# Patient Record
Sex: MALE | Race: WHITE | Employment: UNEMPLOYED | URBAN - METROPOLITAN AREA
[De-identification: names, ages, dates, MRNs, and addresses within clinical notes are randomized per-mention and may not be internally consistent; named-entity substitution may affect disease eponyms.]

---

## 2017-09-22 ENCOUNTER — OFFICE VISIT (OUTPATIENT)
Dept: ORTHOPEDICS | Facility: CLINIC | Age: 27
End: 2017-09-22

## 2017-09-22 VITALS — HEIGHT: 73 IN | WEIGHT: 275.5 LBS | BODY MASS INDEX: 36.51 KG/M2

## 2017-09-22 DIAGNOSIS — C49.9 SARCOMA OF SOFT TISSUE (H): Primary | ICD-10-CM

## 2017-09-22 RX ORDER — DULOXETIN HYDROCHLORIDE 30 MG/1
CAPSULE, DELAYED RELEASE ORAL
COMMUNITY
Start: 2017-07-28

## 2017-09-22 RX ORDER — ALPRAZOLAM 1 MG
1 TABLET ORAL
COMMUNITY

## 2017-09-22 RX ORDER — ALBUTEROL SULFATE 90 UG/1
AEROSOL, METERED RESPIRATORY (INHALATION)
COMMUNITY
Start: 2016-12-05

## 2017-09-22 RX ORDER — CLONAZEPAM 1 MG/1
TABLET ORAL
COMMUNITY
Start: 2017-07-11

## 2017-09-22 RX ORDER — MORPHINE SULFATE 15 MG/1
15 TABLET, FILM COATED, EXTENDED RELEASE ORAL
COMMUNITY
Start: 2017-07-21

## 2017-09-22 RX ORDER — DEXAMETHASONE SODIUM PHOSPHATE 4 MG/ML
INJECTION, SOLUTION INTRA-ARTICULAR; INTRALESIONAL; INTRAMUSCULAR; INTRAVENOUS; SOFT TISSUE
Qty: 30 ML | Refills: 3 | Status: SHIPPED | OUTPATIENT
Start: 2017-09-22

## 2017-09-22 RX ORDER — BACLOFEN 20 MG/1
TABLET ORAL
COMMUNITY
Start: 2014-03-07

## 2017-09-22 RX ORDER — SULINDAC 200 MG/1
200 TABLET ORAL
COMMUNITY
Start: 2017-07-05

## 2017-09-22 RX ORDER — TETRAHYDROZOLINE HCL 0.05 %
1-2 DROPS OPHTHALMIC (EYE)
COMMUNITY

## 2017-09-22 RX ORDER — HYDROCODONE BITARTRATE AND ACETAMINOPHEN 5; 325 MG/1; MG/1
TABLET ORAL
COMMUNITY
Start: 2017-07-21

## 2017-09-22 ASSESSMENT — ENCOUNTER SYMPTOMS
POLYPHAGIA: 0
INCREASED ENERGY: 0
JOINT SWELLING: 0
CHILLS: 0
STIFFNESS: 1
ARTHRALGIAS: 0
MYALGIAS: 1
BACK PAIN: 1
DECREASED APPETITE: 1
INSOMNIA: 1
FEVER: 0
DEPRESSION: 0
MUSCLE WEAKNESS: 0
NERVOUS/ANXIOUS: 1
WEIGHT LOSS: 1
NECK PAIN: 1
FATIGUE: 1
HALLUCINATIONS: 0
MUSCLE CRAMPS: 1
POLYDIPSIA: 0
WEIGHT GAIN: 0
PANIC: 0
DECREASED CONCENTRATION: 0
ALTERED TEMPERATURE REGULATION: 0
NIGHT SWEATS: 0

## 2017-09-22 NOTE — LETTER
9/22/2017       RE: Eduardo Shah  [address] [address] [facility] [address]     Dear Colleague,    Thank you for referring your patient, Eduardo Shah, to the [facility]TH[facility] at [facility]. Please see a copy of my visit note below.    I was present with the PA during the history and exam.  I discussed the case with the PA and agree with the findings as documented in the assessment and plan.     Dictation on: 09/22/2017  2:33 PM by: DIONICIO LOU [[email or web address]]         OUTPATIENT PROGRESS NOTE      CHIEF COMPLAINT:  Right thigh pain.      HISTORY OF PRESENT ILLNESS:  Eduardo is a 27-year-old male with a history of a right thigh soft tissue sarcoma.  He had neoadjuvant chemotherapy and wide excision on 05/08/2014 by Dr. García.  He also had postoperative radiation treatment up near [place].  We have not seen him back for 2 years.  Eduardo reports that for the last couple of years, he has had worsening right side pain.  He states that area that was radiated is very hard and painful.  It is not flexible.  He has pain with prolonged sitting and prolonged standing.  He has pain at night, and is only sleeping a couple of hours at a time.  He has been dealing with a pain specialist locally.  He is taking MS Contin 15 mg every 12 hours, Vicodin 5/325 throughout the day, Cymbalta 30 mg daily, and Zanaflex 4 mg twice daily.  He reports little relief from his pain.  He is wondering about physical therapy.  He is not using anything for gait assistance.  He works as a , and used to work up to 50 hours a week.  Now, he is only working 20 hours a week due to his pain.  He is  with a young son, and has difficulty interacting with his family due to his pain and limitations.  He is frustrated, and wondering if there is any other significant treatment that would be helpful for him.  He is wondering if he will always be disabled.  He recently had an MRI and chest x-ray  locally done.  We are trying to obtain those images.  Eduardo got a report from his doctor that everything was clear.  No other concerns.  The patient is receiving counseling services twice a month.  He is currently smoking, but has cut way back.  I have reviewed his medical intake in the electronic medical record.      PHYSICAL EXAMINATION:  Eduardo is a 27-year-old male who is overweight, but has lost up to 80 pounds since his previous visit.  He appears to despondent at times during the exam, but is cooperative.  Upon ambulation, he has minimal sign of limp or antalgic gait today.  The right lateral thigh has a well-healed scar, but there is a zone around it from the radiation that has significant fibrosis and hardening of the skin and soft tissues.  He has no obvious knee effusion.  No knee or hip pain.  He is tender just in the area around the radiation field.  No obvious masses.  No skin wounds at this time.  He is neurovascularly intact distally.  He does have decreased knee flexion; his left knee and flexes to approximately 130 degrees, the right knee and flexes to only about 110 degrees with significant pain.  He does have full extension.  He has pain with resisted straight leg raise, but is able to perform that 4+/5 strength.  He has 4+/5 resisted knee extension and knee flexion on the right compared to 5/5 on the left.  He has fairly normal hip rotation, again just limited at the end range due to tightness.      IMAGING:  We are waiting for outside MRI and chest x-ray to be imported into our system, and Dr. García will review that and get back to the patient.      IMPRESSION:     1.  A 27-year-old male just over 3 years status post wide excision of right thigh sarcoma with neoadjuvant chemotherapy and postop radiation.   2.  Persistent right thigh pain and fibrosis with decreased range of motion.      PLAN:  Eduardo really has a difficult problem.  Postop radiation changes can cause significant destruction to  tissues.  The first option we would recommend would be aggressive physical therapy to work on scar mobilization, range of motion and quadriceps strengthening.  They can use modalities such as ultrasound or iontophoresis to help with that.  He can do biking or swimming as tolerated.  They will also work on core strengthening.  Hopefully, this will improve some of his pain.  Our goal is not to take away all his pain, as that is probably not reasonable, but we would like to get it manageable, so that he can participate in a full workday and participate in activities with his family.  Another option is to surgically excise and free up some of the scar tissue and damaged muscle.  There is a great risk of wound healing problems due to the radiated area.  Some patients have had some increased range of motion with this operation.  We are not proposing that yet, but would like to see how physical therapy goes.  Eduardo is very hesitant about further surgery.  He agrees with plan for physical therapy, and will do that at home.  We did also encourage him to work on quitting smoking, especially down the road if he does need surgery, as this does complicate wound healing as well.  We would like to see him back in 2 months for a check of his progress and then proceed from there.  He agrees, and all questions were answered.  Dr. García examined the patient as well, and agrees with the plan of care.      Again, thank you for allowing me to participate in the care of your patient.      Sincerely,    Danny García MD

## 2017-09-22 NOTE — MR AVS SNAPSHOT
After Visit Summary   9/22/2017    Eduardo Shah    MRN: [id number]           Patient Information     Date Of Birth          1990        Visit Information        Provider Department      9/22/2017 10:30 AM Danny García MD [facility]        Today's Diagnoses     Sarcoma of soft tissue (H)    -  1       Follow-ups after your visit        Additional Services     PHYSICAL THERAPY REFERRAL (External-Prints)       Physical Therapy Referral                  Follow-up notes from your care team     Return in about 3 months (around 12/22/2017).      Your next 10 appointments already scheduled     Dec 22, 2017 11:00 AM CST   (Arrive by 10:45 AM)   Return Visit with Danny García MD   [facility] ([facility])    [address] [place] [id number] [phone]              Who to contact     Please call your clinic at [phone] to:    Ask questions about your health    Make or cancel appointments    Discuss your medicines    Learn about your test results    Speak to your doctor   If you have compliments or concerns about an experience at your clinic, or if you wish to file a complaint, please contact [facility] Patient Relations at [phone] or email us at [email or web address]         Additional Information About Your Visit        MyChart Information     GIGASt is an electronic gateway that provides easy, online access to your medical records. With AlephD, you can request a clinic appointment, read your test results, renew a prescription or communicate with your care team.     To sign up for GIGASt visit the website at [email or web address]   You will be asked to enter the access code listed below, as well as some personal information. Please follow the directions to create your username and password.     Your access code is: [email or web address]  Expires:  "2017  6:30 AM     Your access code will  in 90 days. If you need help or a new code, please contact your [facility] or call [phone] for assistance.        Care EveryWhere ID     This is your Care EveryWhere ID. This could be used by other organizations to access your [facility] medical records  [id number]        Your Vitals Were     Height BMI (Body Mass Index)                1.86 m (6' 1.23\") 36.12 kg/m2           Blood Pressure from Last 3 Encounters:   No data found for BP    Weight from Last 3 Encounters:   No data found for Wt              Today, you had the following     No orders found for display         Today's Medication Changes          These changes are accurate as of: 17 11:59 PM.  If you have any questions, ask your nurse or doctor.               Start taking these medicines.        Dose/Directions    dexamethasone 4 MG/ML injection   Commonly known as:  DECADRON   Used for:  Sarcoma of soft tissue (H)   Started by:  Danny García MD        Use 4 mg or dose determined by provider for iontophoresis.   Quantity:  30 mL   Refills:  3            Where to get your medicines      Some of these will need a paper prescription and others can be bought over the counter.  Ask your nurse if you have questions.     Bring a paper prescription for each of these medications     dexamethasone 4 MG/ML injection                Primary Care Provider Office Phone # Fax #    Mau Ruiz [phone]       [facility] [address] [facility] [address]        Equal Access to Services     INGA RICHTER AH: Hadii rod denny Soflor, waaxda luqadaha, qaybta kaalmada adeegyada, waxtrent paul frye adedano peterson. So [facility] [phone].    ATENCIÓN: Si habla español, tiene a chua disposición servicios gratuitos de asistencia lingüística. Llame al [phone].    We comply with applicable federal civil rights laws and [place] " laws. We do not discriminate on the basis of race, color, national origin, age, disability, sex, sexual orientation, or gender identity.            Thank you!     Thank you for choosing [facility]  for your care. Our goal is always to provide you with excellent care. Hearing back from our patients is one way we can continue to improve our services. Please take a few minutes to complete the written survey that you may receive in the mail after your visit with us. Thank you!             Your Updated Medication List - Protect others around you: Learn how to safely use, store and throw away your medicines at www.disposemymeds.org.          This list is accurate as of: 9/22/17 11:59 PM.  Always use your most recent med list.                   Brand Name Dispense Instructions for use Diagnosis    * ALPRAZolam 1 MG tablet    XANAX     Take 1 mg by mouth 3 times daily as needed        * ALPRAZolam 1 MG tablet    XANAX     Take 1 mg by mouth        * baclofen 20 MG tablet    LIORESAL     Take 20 mg by mouth 3 times daily        * baclofen 20 MG tablet    LIORESAL          carisoprodol 350 MG tablet    SOMA     Take by mouth 4 times daily as needed for muscle spasms        clonazePAM 1 MG tablet    klonoPIN     TAKE 1 OR 2 TABLETS DAILY AND TAKE ONE TO TWO TABLETS BY MOUTH AT BEDTIME AS NEEDED -MAX 3 TABLETS ONCE DAILY        dexamethasone 4 MG/ML injection    DECADRON    30 mL    Use 4 mg or dose determined by provider for iontophoresis.    Sarcoma of soft tissue (H)       DULoxetine 30 MG EC capsule    CYMBALTA     TAKE 1 CAPSULE BY MOUTH ONCE DAILY FOR 7 DAYS THEN TAKE 1 TWICE DAILY THEREAFTER        gabapentin 300 MG capsule    NEURONTIN    90 capsule    Take 1 capsule (300 mg) by mouth 3 times daily    Sarcoma (H)       HYDROcodone-acetaminophen 5-325 MG per tablet    NORCO          hydrOXYzine 50 MG tablet    ATARAX    120 tablet    Take 1 tablet (50 mg) by mouth 3 times daily as needed    Sarcoma (H)        morphine 15 MG 12 hr tablet    MS CONTIN     Take 15 mg by mouth        sulindac 200 MG tablet    CLINORIL     Take 200 mg by mouth        tetrahydrozoline 0.05 % ophthalmic solution      Apply 1-2 drops to eye        tiZANidine 4 MG tablet    ZANAFLEX     TAKE 1-2 TABLETS BY MOUTH TWICE DAILY AS NEEDED        VENTOLIN  (90 BASE) MCG/ACT Inhaler   Generic drug:  albuterol           * Notice:  This list has 4 medication(s) that are the same as other medications prescribed for you. Read the directions carefully, and ask your doctor or other care provider to review them with you.

## 2017-09-22 NOTE — NURSING NOTE
"Reason For Visit:   Chief Complaint   Patient presents with     RECHECK     Pt. states that he is here today for follow up, Right Thigh Tumor. HX: Wide Resection Right Thigh Tumor. DOS: 05/08/2014.        Pain Assessment  Patient Currently in Pain: Yes  0-10 Pain Scale: 5  Primary Pain Location: Hip  Pain Orientation: Right  Pain Descriptors: Discomfort, Numbness  Alleviating Factors: Pain medication, NSAIDS, Rest  Aggravating Factors: Movement, Lying, Sitting, Walking, Standing               HEIGHT: 6' 1.228\", WEIGHT: 275 lbs 8 oz, BMI: Body mass index is 36.12 kg/(m^2).      Current Outpatient Prescriptions   Medication Sig Dispense Refill     DULoxetine (CYMBALTA) 30 MG EC capsule TAKE 1 CAPSULE BY MOUTH ONCE DAILY FOR 7 DAYS THEN TAKE 1 TWICE DAILY THEREAFTER       HYDROcodone-acetaminophen (NORCO) 5-325 MG per tablet        morphine (MS CONTIN) 15 MG 12 hr tablet Take 15 mg by mouth       sulindac (CLINORIL) 200 MG tablet Take 200 mg by mouth       tetrahydrozoline 0.05 % ophthalmic solution Apply 1-2 drops to eye       tiZANidine (ZANAFLEX) 4 MG tablet TAKE 1-2 TABLETS BY MOUTH TWICE DAILY AS NEEDED       albuterol (VENTOLIN HFA) 108 (90 BASE) MCG/ACT Inhaler        ALPRAZolam (XANAX) 1 MG tablet Take 1 mg by mouth       baclofen (LIORESAL) 20 MG tablet        clonazePAM (KLONOPIN) 1 MG tablet TAKE 1 OR 2 TABLETS DAILY AND TAKE ONE TO TWO TABLETS BY MOUTH AT BEDTIME AS NEEDED -MAX 3 TABLETS ONCE DAILY       carisoprodol (SOMA) 350 MG tablet Take by mouth 4 times daily as needed for muscle spasms       baclofen (LIORESAL) 20 MG tablet Take 20 mg by mouth 3 times daily       hydrOXYzine (ATARAX) 50 MG tablet Take 1 tablet (50 mg) by mouth 3 times daily as needed 120 tablet 0     gabapentin (NEURONTIN) 300 MG capsule Take 1 capsule (300 mg) by mouth 3 times daily 90 capsule 0     ALPRAZolam (XANAX) 1 MG tablet Take 1 mg by mouth 3 times daily as needed          No Known Allergies            "

## 2017-09-22 NOTE — PROGRESS NOTES
Answers for HPI/ROS submitted by the patient on 9/22/2017   General Symptoms: Yes  Skin Symptoms: No  HENT Symptoms: No  EYE SYMPTOMS: No  HEART SYMPTOMS: No  LUNG SYMPTOMS: No  INTESTINAL SYMPTOMS: No  URINARY SYMPTOMS: No  REPRODUCTIVE SYMPTOMS: No  SKELETAL SYMPTOMS: Yes  BLOOD SYMPTOMS: No  NERVOUS SYSTEM SYMPTOMS: No  MENTAL HEALTH SYMPTOMS: Yes  Fever: No  Loss of appetite: Yes  Weight loss: Yes  Weight gain: No  Fatigue: Yes  Night sweats: No  Chills: No  Increased stress: No  Excessive hunger: No  Excessive thirst: No  Feeling hot or cold when others believe the temperature is normal: No  Loss of height: No  Post-operative complications: No  Surgical site pain: Yes  Hallucinations: No  Change in or Loss of Energy: No  Hyperactivity: No  Confusion: No  Back pain: Yes  Muscle aches: Yes  Neck pain: Yes  Swollen joints: No  Joint pain: No  Bone pain: Yes  Muscle cramps: Yes  Muscle weakness: No  Joint stiffness: Yes  Bone fracture: No  Nervous or Anxious: Yes  Depression: No  Trouble sleeping: Yes  Trouble thinking or concentrating: No  Mood changes: No  Panic attacks: No  OUTPATIENT PROGRESS NOTE      CHIEF COMPLAINT:  Right thigh pain.      HISTORY OF PRESENT ILLNESS:  Eduardo is a 27-year-old male with a history of a right thigh soft tissue sarcoma.  He had neoadjuvant chemotherapy and wide excision on 05/08/2014 by Dr. García.  He also had postoperative radiation treatment up near [place].  We have not seen him back for 2 years.  Eduardo reports that for the last couple of years, he has had worsening right thigh pain.  He states that area that was radiated is very hard and painful.  It is not flexible.  He has pain with prolonged sitting and prolonged standing.  He has pain at night, and is only sleeping a couple of hours at a time.  He has been dealing with a pain specialist locally.  He is taking MS Contin 15 mg every 12 hours, Vicodin 5/325 throughout the day, Cymbalta 30 mg daily, and Zanaflex 4 mg twice  daily.  He reports little relief from his pain.  He is wondering about physical therapy.  He is not using anything for gait assistance.  He works as a , and used to work up to 50 hours a week.  Now, he is only working 20 hours a week due to his pain.  He is  with a young son, and has difficulty interacting with his family due to his pain and limitations.  He is frustrated, and wondering if there is any other significant treatment that would be helpful for him.  He is wondering if he will always be disabled.  He recently had an MRI and chest x-ray locally done.  We are trying to obtain those images.  Eduardo got a report from his doctor that everything was clear.  No other concerns.  The patient is receiving counseling services twice a month.  He is currently smoking, but has cut way back.  I have reviewed his medical intake in the electronic medical record.      PHYSICAL EXAMINATION:  Eduardo is a 27-year-old male who is overweight, but has lost up to 80 pounds since his previous visit.  He appears despondent at times during the exam, but is cooperative.  Upon ambulation, he has minimal sign of limp or antalgic gait today.  The right lateral thigh has a well-healed scar, but there is a zone around it from the radiation that has significant fibrosis and hardening of the skin and soft tissues.  He has no obvious knee effusion.  No knee or hip pain.  He is tender just in the area around the radiation field.  No obvious masses.  No skin wounds at this time.  He is neurovascularly intact distally.  He does have decreased knee flexion; his left knee and flexes to approximately 130 degrees, the right knee and flexes to only about 110 degrees with significant pain.  He does have full extension.  He has pain with resisted straight leg raise, but is able to perform that 4+/5 strength.  He has 4+/5 resisted knee extension and knee flexion on the right compared to 5/5 on the left.  He has fairly normal hip rotation,  again just limited at the end range due to tightness.      IMAGING:  We are waiting for outside MRI and chest x-ray to be imported into our system, and Dr. García will review that and get back to the patient.      IMPRESSION:     1.  A 27-year-old male just over 3 years status post wide excision of right thigh sarcoma with neoadjuvant chemotherapy and postop radiation.   2.  Persistent right thigh pain and fibrosis with decreased range of motion.      PLAN:  Eduardo really has a difficult problem.  Postop radiation changes can cause significant destruction to tissues.  The first option we would recommend would be aggressive physical therapy to work on scar mobilization, range of motion and quadriceps strengthening.  They can use modalities such as ultrasound or iontophoresis to help with that.  He can do biking or swimming as tolerated.  They will also work on core strengthening.  Hopefully, this will improve some of his pain.  Our goal is not to take away all his pain, as that is probably not reasonable, but we would like to get it manageable, so that he can participate in a full workday and participate in activities with his family.  Another option is to surgically excise and free up some of the scar tissue and damaged muscle.  There is a great risk of wound healing problems due to the radiated area.  Some patients have had some increased range of motion with this operation.  We are not proposing that yet, but would like to see how physical therapy goes.  Eduardo is very hesitant about further surgery.  He agrees with plan for physical therapy, and will do that at home.  We did also encourage him to work on quitting smoking, especially down the road if he does need surgery, as this does complicate wound healing as well.  We would like to see him back in 2 months for a check of his progress and then proceed from there.  He agrees, and all questions were answered.  Dr. García examined the patient as well, and agrees  with the plan of care.

## 2017-10-23 DIAGNOSIS — C49.9 SARCOMA (H): Primary | ICD-10-CM

## 2017-11-07 ENCOUNTER — TRANSFERRED RECORDS (OUTPATIENT)
Dept: HEALTH INFORMATION MANAGEMENT | Facility: CLINIC | Age: 27
End: 2017-11-07

## 2017-12-22 ENCOUNTER — OFFICE VISIT (OUTPATIENT)
Dept: ORTHOPEDICS | Facility: CLINIC | Age: 27
End: 2017-12-22
Payer: COMMERCIAL

## 2017-12-22 VITALS — WEIGHT: 294 LBS | HEIGHT: 73 IN | BODY MASS INDEX: 38.97 KG/M2

## 2017-12-22 DIAGNOSIS — C49.9 SARCOMA OF SOFT TISSUE (H): Primary | ICD-10-CM

## 2017-12-22 NOTE — LETTER
12/22/2017       RE: Eduardo Shah  [address] [address] [facility] [address]     Dear Colleague,    Thank you for referring your patient, Eduardo Shah, to the [facility] at [facility]. Please see a copy of my visit note below.    I was present with the PA during the history and exam.  I discussed the case with the PA and agree with the findings as documented in the assessment and plan.    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  Last time we saw Gee we did put him on work restrictions, which he states his employer did not follow.  He is working 30 hours a week.  He is attending physical therapy 2-3 days a week.  He reports some improvement in his motion and the feel of the scar tissue, but he continues to have persistent pain that has not changed.  His goals are to decrease pain and to increase the ability to be active with his wife and young son.  He reports he has had a few episodes of his leg giving way.  He has noticed some weakness in the leg.  He denies any numbness or tingling.  No other concerns.  He is not using anything for gait assistance.    Physical exam: Gee is a 27-year-old male with a very flat affect.  He is pleasant and cooperative with the exam.  He has a mild limp and otherwise nonantalgic gait.  His right thigh has radiation changes but does feel more supple compared to the previous exam.  He does have tenderness to palpation of the area within the radiation field.  He has tightness with range of motion.  He has 0  of knee extension and 110  of knee flexion.  She is neurovascularly intact distally.  There is no knee effusion or erythema.    Impression: Persistent pain of the right thigh  status post wide excision of soft tissue sarcoma with postop radiation    Plan: I think Gee would continue to benefit from restrictions at work with less standing and less squatting activities.  He may want to consider a medical leave of absence to help continue to progress his strength and get his pain under control.  We would support this.  We are happy to fill out paperwork regarding this.  She should continue with physical therapy and home exercises.  He needs to work on quitting smoking as soon as possible.  I would like him to try a neoprene hinged knee brace for the right knee to help with stability with activity.  We will have him follow-up in 3 months for another check of his progress.  He will continue to get his pain medicines from his primary care physician, as he has been doing so all along.  All questions answered.  Dr. García examined the patient as well and agrees with the plan of care.    Again, thank you for allowing me to participate in the care of your patient.      Sincerely,    Danny García MD

## 2017-12-22 NOTE — NURSING NOTE
"Reason For Visit:   Chief Complaint   Patient presents with     RECHECK     Pt. states that he is here today for follow up, Right Thigh Tumor. HX: Wide Resection Right Thigh Tumor. DOS: 05/08/2014.        Pain Assessment  Patient Currently in Pain: Yes  0-10 Pain Scale: 7  Primary Pain Location: Leg  Pain Orientation: Right  Pain Descriptors: Discomfort  Alleviating Factors: Rest, Pain medication  Aggravating Factors: Movement, Sitting, Walking, Standing               HEIGHT: 6' 1.23\", WEIGHT: 294 lbs 0 oz, BMI: Body mass index is 38.55 kg/(m^2).      Current Outpatient Prescriptions   Medication Sig Dispense Refill     DULoxetine (CYMBALTA) 30 MG EC capsule TAKE 1 CAPSULE BY MOUTH ONCE DAILY FOR 7 DAYS THEN TAKE 1 TWICE DAILY THEREAFTER       HYDROcodone-acetaminophen (NORCO) 5-325 MG per tablet        morphine (MS CONTIN) 15 MG 12 hr tablet Take 15 mg by mouth       sulindac (CLINORIL) 200 MG tablet Take 200 mg by mouth       tetrahydrozoline 0.05 % ophthalmic solution Apply 1-2 drops to eye       tiZANidine (ZANAFLEX) 4 MG tablet TAKE 1-2 TABLETS BY MOUTH TWICE DAILY AS NEEDED       albuterol (VENTOLIN HFA) 108 (90 BASE) MCG/ACT Inhaler        ALPRAZolam (XANAX) 1 MG tablet Take 1 mg by mouth       baclofen (LIORESAL) 20 MG tablet        clonazePAM (KLONOPIN) 1 MG tablet TAKE 1 OR 2 TABLETS DAILY AND TAKE ONE TO TWO TABLETS BY MOUTH AT BEDTIME AS NEEDED -MAX 3 TABLETS ONCE DAILY       dexamethasone (DECADRON) 4 MG/ML injection Use 4 mg or dose determined by provider for iontophoresis. 30 mL 3     carisoprodol (SOMA) 350 MG tablet Take by mouth 4 times daily as needed for muscle spasms       baclofen (LIORESAL) 20 MG tablet Take 20 mg by mouth 3 times daily       hydrOXYzine (ATARAX) 50 MG tablet Take 1 tablet (50 mg) by mouth 3 times daily as needed 120 tablet 0     gabapentin (NEURONTIN) 300 MG capsule Take 1 capsule (300 mg) by mouth 3 times daily 90 capsule 0     ALPRAZolam (XANAX) 1 MG tablet Take 1 mg by " mouth 3 times daily as needed          No Known Allergies

## 2017-12-22 NOTE — MR AVS SNAPSHOT
"              After Visit Summary   2017    Eduardo Shah    MRN: [id number]           Patient Information     Date Of Birth          1990        Visit Information        Provider Department      2017 11:00 AM Danny García MD [facility]        Today's Diagnoses     Sarcoma of soft tissue (H)    -  1       Follow-ups after your visit        Who to contact     Please call your clinic at [phone] to:    Ask questions about your health    Make or cancel appointments    Discuss your medicines    Learn about your test results    Speak to your doctor   If you have compliments or concerns about an experience at your clinic, or if you wish to file a complaint, please contact [facility] Patient Relations at [phone] or email us at [email or web address]         Additional Information About Your Visit        MyChart Information     [email or web address] is an electronic gateway that provides easy, online access to your medical records. With [employer], you can request a clinic appointment, read your test results, renew a prescription or communicate with your care team.     To sign up for [email or web address] visit the website at [email or web address]   You will be asked to enter the access code listed below, as well as some personal information. Please follow the directions to create your username and password.     Your access code is: [email or web address]  Expires: 3/8/2018  6:30 AM     Your access code will  in 90 days. If you need help or a new code, please contact your [facility] or call [phone] for assistance.        Care EveryWhere ID     This is your Care EveryWhere ID. This could be used by other organizations to access your [facility] medical records  [id number]        Your Vitals Were     Height BMI (Body Mass Index)                1.86 m (6' 1.23\") 38.55 kg/m2           Blood Pressure from Last 3 Encounters: "   05/11/14 141/65   05/07/14 144/87   02/11/14 (!) 158/93    Weight from Last 3 Encounters:   12/22/17 133.4 kg (294 lb)   09/22/17 125 kg (275 lb 8 oz)   02/27/15 (!) 158.8 kg (350 lb)              Today, you had the following     No orders found for display       Primary Care Provider Office Phone # Fax #    Mau Ruiz [phone]       [facility] [address] [address]        Equal Access to Services     [facility]: Hadii rod denny Soflor, waaxda luqadaha, qaybta kaalmada migel, waylon chow . So [facility] [phone].    ATENCIÓN: Si habla español, tiene a chua disposición servicios gratuitos de asistencia lingüística. [facility] [phone].    We comply with applicable federal civil rights laws and [place] laws. We do not discriminate on the basis of race, color, national origin, age, disability, sex, sexual orientation, or gender identity.            Thank you!     Thank you for choosing [facility]  for your care. Our goal is always to provide you with excellent care. Hearing back from our patients is one way we can continue to improve our services. Please take a few minutes to complete the written survey that you may receive in the mail after your visit with us. Thank you!             Your Updated Medication List - Protect others around you: Learn how to safely use, store and throw away your medicines at www.disposemymeds.org.          This list is accurate as of: 12/22/17 11:44 AM.  Always use your most recent med list.                   Brand Name Dispense Instructions for use Diagnosis    * ALPRAZolam 1 MG tablet    XANAX     Take 1 mg by mouth 3 times daily as needed        * ALPRAZolam 1 MG tablet    XANAX     Take 1 mg by mouth        * baclofen 20 MG tablet    LIORESAL     Take 20 mg by mouth 3 times daily        * baclofen 20 MG tablet    LIORESAL          carisoprodol 350 MG tablet    SOMA     Take by  mouth 4 times daily as needed for muscle spasms        clonazePAM 1 MG tablet    klonoPIN     TAKE 1 OR 2 TABLETS DAILY AND TAKE ONE TO TWO TABLETS BY MOUTH AT BEDTIME AS NEEDED -MAX 3 TABLETS ONCE DAILY        dexamethasone 4 MG/ML injection    DECADRON    30 mL    Use 4 mg or dose determined by provider for iontophoresis.    Sarcoma of soft tissue (H)       DULoxetine 30 MG EC capsule    CYMBALTA     TAKE 1 CAPSULE BY MOUTH ONCE DAILY FOR 7 DAYS THEN TAKE 1 TWICE DAILY THEREAFTER        gabapentin 300 MG capsule    NEURONTIN    90 capsule    Take 1 capsule (300 mg) by mouth 3 times daily    Sarcoma (H)       HYDROcodone-acetaminophen 5-325 MG per tablet    NORCO          hydrOXYzine 50 MG tablet    ATARAX    120 tablet    Take 1 tablet (50 mg) by mouth 3 times daily as needed    Sarcoma (H)       morphine 15 MG 12 hr tablet    MS CONTIN     Take 15 mg by mouth        sulindac 200 MG tablet    CLINORIL     Take 200 mg by mouth        tetrahydrozoline 0.05 % ophthalmic solution      Apply 1-2 drops to eye        tiZANidine 4 MG tablet    ZANAFLEX     TAKE 1-2 TABLETS BY MOUTH TWICE DAILY AS NEEDED        VENTOLIN  (90 BASE) MCG/ACT Inhaler   Generic drug:  albuterol           * Notice:  This list has 4 medication(s) that are the same as other medications prescribed for you. Read the directions carefully, and ask your doctor or other care provider to review them with you.

## 2017-12-22 NOTE — LETTER
[facility]  [address] [address]          December 22, 2017    RE:  Eduardo Shah                                                                                                                                                       [address] [address] [facility] [address]            To whom it may concern:    Eduardo Shah is under my professional care for right thigh sarcoma. He may return to work with the following:   The employee is UNABLE to return to full work duties until 3/22/18. Please consider leave of absence to focus on range of motion, strengthening and Physical Therapy to improve pain, range of motion and strength.    When the patient returns to work, the following restrictions apply until 3/22/18:  A) Bend: Occasionally (1-3 hours)  B) Squat: Not at all (0 hours)  C) Walk/Stand: Occasionally (1-3 hours)  D) Reach Above Shoulders: Frequently (4-8 hours)  E) Lift, carry, push, and pull no more than:  0-10 lbs.  Light duty-unable to bend, stoop or twist on or about the right leg due to radiation scarring from treatment of sarcoma.      Sincerely,        Erendira Merida PA-C

## 2017-12-22 NOTE — PROGRESS NOTES
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  Last time we saw Gee we did put him on work restrictions, which he states his employer did not follow.  He is working 30 hours a week.  He is attending physical therapy 2-3 days a week.  He reports some improvement in his motion and the feel of the scar tissue, but he continues to have persistent pain that has not changed.  His goals are to decrease pain and to increase the ability to be active with his wife and young son.  He reports he has had a few episodes of his leg giving way.  He has noticed some weakness in the leg.  He denies any numbness or tingling.  No other concerns.  He is not using anything for gait assistance.    Physical exam: Gee is a 27-year-old male with a very flat affect.  He is pleasant and cooperative with the exam.  He has a mild limp and otherwise nonantalgic gait.  His right thigh has radiation changes but does feel more supple compared to the previous exam.  He does have tenderness to palpation of the area within the radiation field.  He has tightness with range of motion.  He has 0  of knee extension and 110  of knee flexion.  She is neurovascularly intact distally.  There is no knee effusion or erythema.    Impression: Persistent pain of the right thigh status post wide excision of soft tissue sarcoma with postop radiation    Plan: I think Gee would continue to benefit from restrictions at work with less standing and less squatting activities.  He may want to consider a medical leave of absence to help continue to progress his strength and get his pain under control.  We would support this.  We are happy to fill out paperwork regarding this.  She should continue with physical therapy and home  exercises.  He needs to work on quitting smoking as soon as possible.  I would like him to try a neoprene hinged knee brace for the right knee to help with stability with activity.  We will have him follow-up in 3 months for another check of his progress.  He will continue to get his pain medicines from his primary care physician, as he has been doing so all along.  All questions answered.  Dr. García examined the patient as well and agrees with the plan of care.